# Patient Record
Sex: MALE | Race: WHITE | URBAN - METROPOLITAN AREA
[De-identification: names, ages, dates, MRNs, and addresses within clinical notes are randomized per-mention and may not be internally consistent; named-entity substitution may affect disease eponyms.]

---

## 2020-11-23 ENCOUNTER — OFFICE VISIT (OUTPATIENT)
Dept: HEMATOLOGY | Age: 40
End: 2020-11-23

## 2020-11-23 ENCOUNTER — HOSPITAL ENCOUNTER (OUTPATIENT)
Dept: LAB | Age: 40
Discharge: HOME | End: 2020-11-23
Payer: COMMERCIAL

## 2020-11-23 VITALS
SYSTOLIC BLOOD PRESSURE: 115 MMHG | HEART RATE: 95 BPM | TEMPERATURE: 97 F | WEIGHT: 297 LBS | HEIGHT: 72 IN | BODY MASS INDEX: 40.23 KG/M2 | DIASTOLIC BLOOD PRESSURE: 79 MMHG | OXYGEN SATURATION: 98 %

## 2020-11-23 DIAGNOSIS — K76.0 NAFLD (NONALCOHOLIC FATTY LIVER DISEASE): ICD-10-CM

## 2020-11-23 DIAGNOSIS — K76.0 NAFLD (NONALCOHOLIC FATTY LIVER DISEASE): Primary | ICD-10-CM

## 2020-11-23 PROBLEM — S06.9XAA TBI (TRAUMATIC BRAIN INJURY): Status: ACTIVE | Noted: 2020-11-23

## 2020-11-23 LAB
ALBUMIN SERPL-MCNC: 4.2 G/DL (ref 3.4–5)
ALBUMIN/GLOB SERPL: 1.3 {RATIO} (ref 0.8–1.7)
ALP SERPL-CCNC: 112 U/L (ref 45–117)
ALT SERPL-CCNC: 90 U/L (ref 16–61)
AST SERPL-CCNC: 46 U/L (ref 10–38)
BASOPHILS # BLD: 0 K/UL (ref 0–0.1)
BASOPHILS NFR BLD: 0 % (ref 0–2)
BILIRUB DIRECT SERPL-MCNC: 0.3 MG/DL (ref 0–0.2)
BILIRUB SERPL-MCNC: 1.4 MG/DL (ref 0.2–1)
DIFFERENTIAL METHOD BLD: NORMAL
EOSINOPHIL # BLD: 0.4 K/UL (ref 0–0.4)
EOSINOPHIL NFR BLD: 5 % (ref 0–5)
ERYTHROCYTE [DISTWIDTH] IN BLOOD BY AUTOMATED COUNT: 13 % (ref 11.6–14.5)
FERRITIN SERPL-MCNC: 160 NG/ML (ref 8–388)
GLOBULIN SER CALC-MCNC: 3.2 G/DL (ref 2–4)
HCT VFR BLD AUTO: 45.8 % (ref 36–48)
HGB BLD-MCNC: 15.9 G/DL (ref 13–16)
INR PPP: 1.1 (ref 0.8–1.2)
IRON SATN MFR SERPL: 25 % (ref 20–50)
IRON SERPL-MCNC: 82 UG/DL (ref 50–175)
LYMPHOCYTES # BLD: 1.9 K/UL (ref 0.9–3.6)
LYMPHOCYTES NFR BLD: 24 % (ref 21–52)
MCH RBC QN AUTO: 31.7 PG (ref 24–34)
MCHC RBC AUTO-ENTMCNC: 34.7 G/DL (ref 31–37)
MCV RBC AUTO: 91.2 FL (ref 74–97)
MONOCYTES # BLD: 0.7 K/UL (ref 0.05–1.2)
MONOCYTES NFR BLD: 10 % (ref 3–10)
NEUTS SEG # BLD: 4.7 K/UL (ref 1.8–8)
NEUTS SEG NFR BLD: 61 % (ref 40–73)
PLATELET # BLD AUTO: 265 K/UL (ref 135–420)
PMV BLD AUTO: 10.2 FL (ref 9.2–11.8)
PROT SERPL-MCNC: 7.4 G/DL (ref 6.4–8.2)
PROTHROMBIN TIME: 13.5 SEC (ref 11.5–15.2)
RBC # BLD AUTO: 5.02 M/UL (ref 4.7–5.5)
TIBC SERPL-MCNC: 328 UG/DL (ref 250–450)
WBC # BLD AUTO: 7.7 K/UL (ref 4.6–13.2)

## 2020-11-23 PROCEDURE — 86704 HEP B CORE ANTIBODY TOTAL: CPT

## 2020-11-23 PROCEDURE — 99204 OFFICE O/P NEW MOD 45 MIN: CPT | Performed by: NURSE PRACTITIONER

## 2020-11-23 PROCEDURE — 86706 HEP B SURFACE ANTIBODY: CPT

## 2020-11-23 PROCEDURE — 85025 COMPLETE CBC W/AUTO DIFF WBC: CPT

## 2020-11-23 PROCEDURE — 83540 ASSAY OF IRON: CPT

## 2020-11-23 PROCEDURE — 85610 PROTHROMBIN TIME: CPT

## 2020-11-23 PROCEDURE — 83516 IMMUNOASSAY NONANTIBODY: CPT

## 2020-11-23 PROCEDURE — 82728 ASSAY OF FERRITIN: CPT

## 2020-11-23 PROCEDURE — 86803 HEPATITIS C AB TEST: CPT

## 2020-11-23 PROCEDURE — 86256 FLUORESCENT ANTIBODY TITER: CPT

## 2020-11-23 PROCEDURE — 82107 ALPHA-FETOPROTEIN L3: CPT

## 2020-11-23 PROCEDURE — 86038 ANTINUCLEAR ANTIBODIES: CPT

## 2020-11-23 PROCEDURE — 80076 HEPATIC FUNCTION PANEL: CPT

## 2020-11-23 PROCEDURE — 36415 COLL VENOUS BLD VENIPUNCTURE: CPT

## 2020-11-23 PROCEDURE — 86708 HEPATITIS A ANTIBODY: CPT

## 2020-11-23 RX ORDER — PHENTERMINE HYDROCHLORIDE 30 MG/1
CAPSULE ORAL
COMMUNITY
Start: 2020-11-09 | End: 2022-02-23

## 2020-11-23 RX ORDER — TRAZODONE HYDROCHLORIDE 50 MG/1
50 TABLET ORAL
COMMUNITY
Start: 2020-11-09

## 2020-11-23 NOTE — PROGRESS NOTES
[address] [facility], Varun WOLFE, Joycelyn Mixon, Caitlyn Hernandez MD, MPH      Liana Navarro, CODY Mathias, [facility]     Rylie Conte, [facility]   Natividad Palomino, [facility]    Yazmin García, [facility]       [facility] [address]    at [address], [facility] Thai Bates  22.    [phone]    FAX: [address] [place] [address], [address]    [phone]    FAX: [phone]         Patient Care Team:  Pete Cortez NP as PCP - General (Nurse Practitioner)      Problem List  Date Reviewed: 11/23/2020          Codes Class Noted    NAFLD (nonalcoholic fatty liver disease) ICD-10-CM: K76.0  ICD-9-CM: 571.8  11/23/2020        TBI (traumatic brain injury) ([facility] ICD-10-CM: S06. 9X9A  ICD-9-CM: 854.00  11/23/2020                The clinicians listed above have asked me to see Radha Timmons in consultation regarding suspected fatty liver disease and its management. All medical records sent by the referring physicians were reviewed including imaging studies. The patient is a 47 y.o.  male who is suspected to have fatty liver disease based upon ultrasound. The most recent laboratory studies indicate that the liver transaminases are elevated,  alkaline phosphatase is normal, tests of hepatic synthetic and metabolic function are   normal, and the platelet count is normal.      Serologic evaluation for markers of chronic liver disease has either not been performed or the results are not available. The most recent imaging of the liver was Ultrasound performed in 10/2020. Results suggest fatty liver disease. No liver mass lesions noted.     An assessment of liver fibrosis with biopsy or elastography has not been performed. The patient had not started any new medications within 3 months preceding the elevation in liver chemistries. The patient has no symptoms which can be attributed to the liver disorder. The patient completes all daily activities without any functional limitations. The patient has not experienced fatigue, fevers, chills, shortness of breath, chest pain, pain in the right side over the liver, diffuse abdominal pain, nausea, vomiting, constipation, diarrhrea, dry eyes, dry mouth, arthralgias, myalgias, yellowing of the eyes or skin, itching, dark urine, problems concentrating, swelling of the abdomen, swelling of the lower extremities, hematemesis, or hematochezia. ASSESSMENT AND PLAN:  Fatty liver  The diagnosis is based upon imaging. The histologic severity has not been defined. The most recent laboratory studies indicate that the liver transaminases are elevated,  alkaline phosphatase is normal, tests of hepatic synthetic and metabolic function are   normal, and the platelet count is normal.      Based upon laboratory studies and imaging, the patient does not appear to have significant liver injury. Will perform laboratory testing to monitor liver function and degree of liver injury. Serologic testing for causes of chronic liver disease was ordered. Only a liver biopsy can confirm if the patient does have fatty liver and differentiate NAFL from PASCUAL. The treatment is the same; weight reduction. If a liver biopsy demonstrates PASCUAL the patient could be eligible for enrollment into clinical trials for treatment of PASCUAL. The need to perform a liver biopsy to help determine the cause and severity of the liver test abnormalities was discussed. The risks of performing the liver biopsy including pain, puncture of the lung, gallbladder, intestine or kidney and bleeding were discussed. There is no reason to perform liver biopsy at this time.   Liver chemistries will be monitored. If the liver enzymes remain persistently elevated over the next 1-2 years a liver biopsy should be performed to ensure there is no ongoing chronic liver disease. If the patient looses 20% of current body weight, which is 60 pounds, down to a weight of of 240 pounds, all steatosis will have resolved. Once all steatosis has resolved all inflammation will resolve. Then all fibrosis will gradually resolve and the liver could eventually be normal.    There is currently no FDA approved medical treatment for fatty liver, NALFD or PASCUAL. The only medical treatments for PASCUAL are though clinical trials. The patient would be a good candidate for enrollment into a clinical trial if found to have PASCUAL. The need to perform an assessment of liver fibrosis was discussed with the patient. The Fibroscan can assess liver fibrosis and determine if a patient has advanced fibrosis or cirrhosis without the need for liver biopsy. This will be performed at the next office visit. If the Fibroscan suggests advanced fibrosis then a liver biopsy should be considered. The Fibroscan can be repeated annually or as often as clinically indicated to assess for fibrosis progression and/or regression. Since a liver biopsy was not performed it is possible the patient may not have fatty liver or this may not be contributing to the elevation in liver enzymes. If liver enzymes do not return to normal with weight reduction then additional evaluation including liver biopsy may be necessary to determine if the elevated liver enzymes is due to another etiology. Counseling for diet and weight loss in patients with confirmed or suspected NAFLD  The patient was counseled regarding diet and exercise to achieve weight loss.   The best diet for patients with fatty liver is one very low in carbohydrates and enriched with protein such as an Blanka's program.      The patient was told not to consume any food products and drinks containing fructose as this enhances hepatic fat synthesis. There is no medication or vitamin supplements that we advocate for PASCUAL. Using glitazones in patients without diabetes mellitus has been shown to reduce fat content in the liver but has no effect on fibrosis and is associated with weight gain. Vitamin E has also been used but the data is not very good and most experts no longer advocate this. Screening for Hepatocellular Carcinoma  [address] screening has recently been performed with ultrasound and does not suggest [address] AFP was ordered today. Treatment of other medical problems in patients with chronic liver disease  There are no contraindications for the patient to take most medications that are necessary for treatment of other medical issues. The patient does not comsume alcohol on a daily basis. Normal doses of acetaminophen, as recommended on the label of the bottle, are not hepatotoxic except in the setting of daily alcohol use, even in patients with cirrhosis and can be utilized for pain. Counseling for alcohol in patients with chronic liver disease  The patient was counseled regarding alcohol consumption and the effect of alcohol on chronic liver disease. The patient does not consume any significant amount of alcohol. The patient has been abstinent from alcohol since 06/2018 when he suffered a TBI. The patient was reminded that alcohol can cause fatty liver. Vaccinations   The need for vaccination against viral hepatitis A and B will be assessed with serologic and instituted as appropriate. Routine vaccinations against other bacterial and viral agents can be performed as indicated. Annual flu vaccination should be administered if indicated. ALLERGIES  Allergies no known allergies    MEDICATIONS  Current Outpatient Medications   Medication Sig    traZODone (DESYREL) 50 mg tablet Take 50 mg by mouth.     phentermine 30 mg capsule TAKE 1 CAPSULE BY MOUTH ONCE DAILY BEFORE BREAKFAST     No current facility-administered medications for this visit. SYSTEM REVIEW NOT RELATED TO LIVER DISEASE OR REVIEWED ABOVE:  Constitution systems: Negative for fever, chills, weight gain, weight loss. Eyes: Negative for visual changes. ENT: Negative for sore throat, painful swallowing. Respiratory: Negative for cough, hemoptysis, SOB. Cardiology: Negative for chest pain, palpitations. GI:  Negative for constipation or diarrhea. : Negative for urinary frequency, dysuria, hematuria, nocturia. Skin: Negative for rash. Hematology: Negative for easy bruising, blood clots. Musculo-skelatal: Negative for back pain, muscle pain, weakness. Neurologic: Negative for headaches, dizziness, vertigo, memory problems not related to HE. Psychology: Negative for anxiety, depression. FAMILY HISTORY:  The father  of metastatic kidney cancer at age 62. The mother has the following chronic disease(s): history of ovarian cancer. There is no family history of liver disease. There is no family history of immune disorders. SOCIAL HISTORY:  The patient is . The patient has 4 children. The patient has never used tobacco products. The patient never been a big drinker. The patient has been abstinent from alcohol since 2018. The patient currently works full time as [employer]. PHYSICAL EXAMINATION:  VS: per nursing note  General: No acute distress. Eyes: Sclera anicteric. ENT: No oral lesions. Thyroid normal.  Nodes: No adenopathy. Skin: No spider angiomata. No jaundice. No palmar erythema. Respiratory: Lungs clear to auscultation. Cardiovascular: Regular heart rate. No murmurs. No JVD. Abdomen: Soft non-tender, liver size normal to percussion/palpation. Spleen not palpable. No obvious ascites. Extremities: No edema. No muscle wasting. No gross arthritic changes. Neurologic: Alert and oriented.   Cranial nerves grossly intact. No asterixis. LABORATORY STUDIES:  Liver [place] of [address] [place] [address] 11/23/2020   WBC 4.6 - 13.2 K/uL 7.7   ANC 1.8 - 8.0 K/UL 4.7   HGB 13.0 - 16.0 g/dL 15.9    - 420 K/uL 265   INR 0.8 - 1.2   1.1   AST 10 - 38 U/L 46 (H)   ALT 16 - 61 U/L 90 (H)   Alk Phos 45 - 117 U/L 112   Bili, Total 0.2 - 1.0 MG/DL 1.4 (H)   Bili, Direct 0.0 - 0.2 MG/DL 0.3 (H)   Albumin 3.4 - 5.0 g/dL 4.2     From 11/2020 DR SAM AYOUB [facility]):  AST/ ALT/ ALP/ T. BILI/ ALB: 59/ 79/ 97/ 1.0/ 4.5  BUN/ CRT/ PLT: 9/ 0.9/ 274,000    SEROLOGIES:  Not available or performed. Testing was performed today. LIVER HISTOLOGY:  Not available or performed    ENDOSCOPIC PROCEDURES:  Not available or performed    RADIOLOGY:  10/2020. Abdominal ultrasound DR SAM AYOUB [facility]): Enlarged measuring 19 cm in length and increased in echotexture suggesting fatty infiltration. OTHER TESTING:  Not available or performed    FOLLOW-UP:  All of the issues listed above in the Assessment and Plan were discussed with the patient. All questions were answered. The patient expressed a clear understanding of the above. [facility] in 4 weeks for Fibroscan and to review all data and determine the treatment plan.     Genia Meredith, JUSTIN-LELE  Liver [place] [address], [address] [place] [address]   [phone]

## 2020-11-24 LAB
ACTIN IGG SERPL-ACNC: 3 UNITS (ref 0–19)
AFP L3 MFR SERPL: NORMAL % (ref 0–9.9)
AFP SERPL-MCNC: 2.4 NG/ML (ref 0–8)
C-ANCA TITR SER IF: NORMAL TITER
COMMENT, 144067: NORMAL
HAV AB SER QL IA: NEGATIVE
HBV CORE AB SERPL QL IA: NEGATIVE
HBV SURFACE AB SER QL IA: NEGATIVE
HBV SURFACE AB SERPL IA-ACNC: <3.1 MIU/ML
HCV AB S/CO SERPL IA: <0.1 S/CO RATIO (ref 0–0.9)
HEP BS AB COMMENT,HBSAC: ABNORMAL
P-ANCA ATYPICAL TITR SER IF: NORMAL TITER
P-ANCA TITR SER IF: NORMAL TITER

## 2020-11-29 LAB — ANA TITR SER IF: NEGATIVE {TITER}

## 2020-12-22 ENCOUNTER — OFFICE VISIT (OUTPATIENT)
Dept: HEMATOLOGY | Age: 40
End: 2020-12-22
Payer: COMMERCIAL

## 2020-12-22 VITALS
SYSTOLIC BLOOD PRESSURE: 137 MMHG | HEART RATE: 87 BPM | WEIGHT: 289.38 LBS | DIASTOLIC BLOOD PRESSURE: 91 MMHG | TEMPERATURE: 97.6 F | OXYGEN SATURATION: 92 % | BODY MASS INDEX: 39.25 KG/M2

## 2020-12-22 DIAGNOSIS — K76.0 NAFLD (NONALCOHOLIC FATTY LIVER DISEASE): Primary | ICD-10-CM

## 2020-12-22 DIAGNOSIS — E66.01 SEVERE OBESITY (HCC): ICD-10-CM

## 2020-12-22 PROCEDURE — 91200 LIVER ELASTOGRAPHY: CPT | Performed by: NURSE PRACTITIONER

## 2020-12-22 PROCEDURE — 99214 OFFICE O/P EST MOD 30 MIN: CPT | Performed by: NURSE PRACTITIONER

## 2020-12-22 NOTE — PROGRESS NOTES
[address] hospitals, MD, [address], Cite Alexa JULIAN Mixon MD, MPH      ELSY Andrews-LELE Mccauley, [facility]     Rylie ENGLE Inés, [facility]   Theron Hylton, P[facility]    Nia Claudio, [facility]       Cajem Torres Kenny De [address] 136    at [address], [address] Thai Bates  22.    [phone]    FAX: [address] [place] [address], [address], [address]    [phone]    FAX: [phone]         Patient Care Team:  Mehrdad Davidson NP as PCP - General (Nurse Practitioner)      Problem List  Date Reviewed: 12/22/2020          Codes Class Noted    Severe obesity [facility]) ICD-10-CM: E66.01  ICD-9-CM: 278.01  12/22/2020        NAFLD (nonalcoholic fatty liver disease) ICD-10-CM: K76.0  ICD-9-CM: 571.8  11/23/2020        TBI (traumatic brain injury) ([facility] ICD-10-CM: S06. 9X9A  ICD-9-CM: 854.00  11/23/2020                Calvin Nicole returns to the [facility] today for fibroscan assessment of hepatic fibrosis and for education and management of suspected fatty liver disease. The active problem list, all pertinent past medical history, medications, liver histology, endoscopic studies, radiologic findings and laboratory findings related to the liver disorder were reviewed with the patient. The patient is a 47 y.o.  male who is suspected to have fatty liver disease based upon ultrasound. The most recent laboratory studies indicate that the liver transaminases are elevated,  alkaline phosphatase is normal, tests of hepatic synthetic and metabolic function are   normal, and the platelet count is normal.      Serologic evaluation was negative for all causes of chronic liver disease.      The most recent imaging of the liver was Ultrasound performed in 10/2020. Results suggest fatty liver disease. No liver mass lesions noted. An assessment of liver fibrosis with fibroscan was performed during this appointment. Results of the scan indicate normal liver stiffness, no hepatic fibrosis, and fatty liver. The patient had not started any new medications within 3 months preceding the elevation in liver chemistries. The patient has no symptoms which can be attributed to the liver disorder. The patient completes all daily activities without any functional limitations. The patient has not experienced fatigue, fevers, chills, shortness of breath, chest pain, pain in the right side over the liver, diffuse abdominal pain, nausea, vomiting, constipation, diarrhrea, dry eyes, dry mouth, arthralgias, myalgias, yellowing of the eyes or skin, itching, dark urine, problems concentrating, swelling of the abdomen, swelling of the lower extremities, hematemesis, or hematochezia. ASSESSMENT AND PLAN:  Fatty liver/Benign Steatosis  The diagnosis is based upon imaging and fibroscan CAP score. Fibroscan analysis indicates no hepatic fibrosis. The most recent laboratory studies indicate that the liver transaminases are elevated,  alkaline phosphatase is normal, tests of hepatic synthetic and metabolic function are   normal, and the platelet count is normal.      Based upon laboratory studies, imaging, and fibroscan assessment, the patient does not have significant liver injury. Serologic evaluation was negative for all causes of chronic liver disease. Only a liver biopsy can confirm if the patient does have fatty liver and differentiate NAFL from PASCUAL. The treatment is the same; weight reduction. If a liver biopsy demonstrates PASCUAL the patient could be eligible for enrollment into clinical trials for treatment of PASCUAL.     If the liver enzymes remain persistently elevated over the next 1-2 years a liver biopsy should be performed to ensure there is no ongoing chronic liver disease. If the patient looses 20% of current body weight, which is 60 pounds, down to a weight of of 240 pounds, all steatosis will have resolved. Once all steatosis has resolved all inflammation will resolve. Then all fibrosis will gradually resolve and the liver could eventually be normal.    There is currently no FDA approved medical treatment for fatty liver, NALFD or PASCUAL. The only medical treatments for PASCUAL are though clinical trials. The patient would be a good candidate for enrollment into a clinical trial if found to have PASCUAL. The need to perform an assessment of liver fibrosis was discussed with the patient. The Fibroscan can assess liver fibrosis and determine if a patient has advanced fibrosis or cirrhosis without the need for liver biopsy. This was performed during this appointment. There is no hepatic fibrosis. The Fibroscan can be repeated annually or as often as clinically indicated to assess for fibrosis progression and/or regression. Since a liver biopsy was not performed it is possible the patient may not have fatty liver or this may not be contributing to the elevation in liver enzymes. If liver enzymes do not return to normal with weight reduction then additional evaluation including liver biopsy may be necessary to determine if the elevated liver enzymes is due to another etiology. Counseling for diet and weight loss in patients with confirmed or suspected NAFLD  The patient was counseled regarding diet and exercise to achieve weight loss. The best diet for patients with fatty liver is one very low in carbohydrates and enriched with protein such as an Blanka's program.      The patient was told not to consume any food products and drinks containing fructose as this enhances hepatic fat synthesis. There is no medication or vitamin supplements that we advocate for PASCUAL.     Using glitazones in patients without diabetes mellitus has been shown to reduce fat content in the liver but has no effect on fibrosis and is associated with weight gain. Vitamin E has also been used but the data is not very good and most experts no longer advocate this. Screening for Hepatocellular Carcinoma  [facility] screening has recently been performed with ultrasound and does not suggest [address] AFP was WNL. Treatment of other medical problems in patients with chronic liver disease  There are no contraindications for the patient to take most medications that are necessary for treatment of other medical issues. The patient does not comsume alcohol on a daily basis. Normal doses of acetaminophen, as recommended on the label of the bottle, are not hepatotoxic except in the setting of daily alcohol use, even in patients with cirrhosis and can be utilized for pain. Counseling for alcohol in patients with chronic liver disease  The patient was counseled regarding alcohol consumption and the effect of alcohol on chronic liver disease. The patient does not consume any significant amount of alcohol. The patient has been abstinent from alcohol since 06/2018 when he suffered a TBI. The patient was reminded that alcohol can cause fatty liver. Vaccinations   Vaccination for viral hepatitis A and B is recommended. The patient does not have serologic evidence of prior exposure or vaccination with immunity. Routine vaccinations against other bacterial and viral agents can be performed as indicated. Annual flu vaccination should be administered if indicated. ALLERGIES  No Known Allergies    MEDICATIONS  Current Outpatient Medications   Medication Sig    traZODone (DESYREL) 50 mg tablet Take 50 mg by mouth.  phentermine 30 mg capsule TAKE 1 CAPSULE BY MOUTH ONCE DAILY BEFORE BREAKFAST     No current facility-administered medications for this visit.         SYSTEM REVIEW NOT RELATED TO LIVER DISEASE OR REVIEWED ABOVE:  Constitution systems: Negative for fever, chills, weight gain, weight loss. Eyes: Negative for visual changes. ENT: Negative for sore throat, painful swallowing. Respiratory: Negative for cough, hemoptysis, SOB. Cardiology: Negative for chest pain, palpitations. GI:  Negative for constipation or diarrhea. : Negative for urinary frequency, dysuria, hematuria, nocturia. Skin: Negative for rash. Hematology: Negative for easy bruising, blood clots. Musculo-skelatal: Negative for back pain, muscle pain, weakness. Neurologic: Negative for headaches, dizziness, vertigo, memory problems not related to HE. Psychology: Negative for anxiety, depression. FAMILY HISTORY:  The father  of metastatic kidney cancer at age 62. The mother has the following chronic disease(s): history of ovarian cancer. There is no family history of liver disease. There is no family history of immune disorders. SOCIAL HISTORY:  The patient is . The patient has 4 children. The patient has never used tobacco products. The patient never been a big drinker. The patient has been abstinent from alcohol since 2018. The patient currently works full time as [employer]. PHYSICAL EXAMINATION:  VS: per nursing note  General: No acute distress. Eyes: Sclera anicteric. ENT: No oral lesions. Thyroid normal.  Nodes: No adenopathy. Skin: No spider angiomata. No jaundice. No palmar erythema. Respiratory: Lungs clear to auscultation. Cardiovascular: Regular heart rate. No murmurs. No JVD. Abdomen: Soft non-tender, liver size normal to percussion/palpation. Spleen not palpable. No obvious ascites. Extremities: No edema. No muscle wasting. No gross arthritic changes. Neurologic: Alert and oriented. Cranial nerves grossly intact. No asterixis.     LABORATORY STUDIES:  Liver [place] of [address] & Units 2020   WBC 4.6 - 13.2 K/uL 7.7   ANC 1.8 - 8.0 K/UL 4.7   HGB 13.0 - 16.0 g/dL 15.9   PLT 135 - 420 K/uL 265   INR 0.8 - 1.2   1.1   AST 10 - 38 U/L 46 (H)   ALT 16 - 61 U/L 90 (H)   Alk Phos 45 - 117 U/L 112   Bili, Total 0.2 - 1.0 MG/DL 1.4 (H)   Bili, Direct 0.0 - 0.2 MG/DL 0.3 (H)   Albumin 3.4 - 5.0 g/dL 4.2     From 11/2020 DR SAM AYOUB [facility]):  AST/ ALT/ ALP/ T. BILI/ ALB: 59/ 79/ 97/ 1.0/ 4.5  BUN/ CRT/ PLT: 9/ 0.9/ 274,000    SEROLOGIES:  Serologies Latest Ref Rng & Units 11/23/2020   Hep A Ab, Total Negative   Negative   Hep B Core Ab, Total Negative   Negative   Hep B Surface Ab >10.0 mIU/mL <3.10 (L)   Hep B Surface Ab Interp POS   Negative (A)   Hep C Ab 0.0 - 0.9 s/co ratio <0.1   Ferritin 8 - 388 NG/   Iron % Saturation 20 - 50 % 25   ERLIN, IFA  Negative   C-ANCA Neg:<1:20 titer <1:20   P-ANCA Neg:<1:20 titer <1:20   ANCA Neg:<1:20 titer <1:20   ASMCA 0 - 19 Units 3     LIVER HISTOLOGY:  12/2020. FibroScan performed at [address]. EkPa was 5.4. IQR/med 15%. . The results suggested a fibrosis level of F0. The CAP score suggests fatty liver. ENDOSCOPIC PROCEDURES:  Not available or performed    RADIOLOGY:  10/2020. Abdominal ultrasound DR SAM AYOUB [facility]): Enlarged measuring 19 cm in length and increased in echotexture suggesting fatty infiltration. OTHER TESTING:  Not available or performed    FOLLOW-UP:  All of the issues listed above in the Assessment and Plan were discussed with the patient. All questions were answered. The patient expressed a clear understanding of the above. [address] in 6 months with a weight loss goal of 12 pounds.       JUSTIN Nielson-C  Liver [place] of [address], [address] [place] [address], [address] [place] [address]   [phone]

## 2021-06-23 ENCOUNTER — OFFICE VISIT (OUTPATIENT)
Dept: HEMATOLOGY | Age: 41
End: 2021-06-23
Payer: COMMERCIAL

## 2021-06-23 ENCOUNTER — HOSPITAL ENCOUNTER (OUTPATIENT)
Dept: LAB | Age: 41
Discharge: HOME | End: 2021-06-23
Payer: COMMERCIAL

## 2021-06-23 VITALS
HEART RATE: 82 BPM | RESPIRATION RATE: 17 BRPM | BODY MASS INDEX: 38.49 KG/M2 | DIASTOLIC BLOOD PRESSURE: 103 MMHG | TEMPERATURE: 97 F | SYSTOLIC BLOOD PRESSURE: 132 MMHG | WEIGHT: 284.2 LBS | OXYGEN SATURATION: 98 % | HEIGHT: 72 IN

## 2021-06-23 DIAGNOSIS — K76.0 NAFLD (NONALCOHOLIC FATTY LIVER DISEASE): ICD-10-CM

## 2021-06-23 DIAGNOSIS — K76.0 NAFLD (NONALCOHOLIC FATTY LIVER DISEASE): Primary | ICD-10-CM

## 2021-06-23 LAB
ALBUMIN SERPL-MCNC: 4.1 G/DL (ref 3.4–5)
ALBUMIN/GLOB SERPL: 1.2 {RATIO} (ref 0.8–1.7)
ALP SERPL-CCNC: 96 U/L (ref 45–117)
ALT SERPL-CCNC: 68 U/L (ref 16–61)
ANION GAP SERPL CALC-SCNC: 4 MMOL/L (ref 3–18)
AST SERPL-CCNC: 32 U/L (ref 10–38)
BASOPHILS # BLD: 0.1 K/UL (ref 0–0.1)
BASOPHILS NFR BLD: 1 % (ref 0–2)
BILIRUB DIRECT SERPL-MCNC: 0.2 MG/DL (ref 0–0.2)
BILIRUB SERPL-MCNC: 1.2 MG/DL (ref 0.2–1)
BUN SERPL-MCNC: 14 MG/DL (ref 7–18)
BUN/CREAT SERPL: 14 (ref 12–20)
CALCIUM SERPL-MCNC: 9.5 MG/DL (ref 8.5–10.1)
CHLORIDE SERPL-SCNC: 108 MMOL/L (ref 100–111)
CO2 SERPL-SCNC: 30 MMOL/L (ref 21–32)
CREAT SERPL-MCNC: 0.97 MG/DL (ref 0.6–1.3)
DIFFERENTIAL METHOD BLD: ABNORMAL
EOSINOPHIL # BLD: 0.3 K/UL (ref 0–0.4)
EOSINOPHIL NFR BLD: 4 % (ref 0–5)
ERYTHROCYTE [DISTWIDTH] IN BLOOD BY AUTOMATED COUNT: 13 % (ref 11.6–14.5)
GLOBULIN SER CALC-MCNC: 3.4 G/DL (ref 2–4)
GLUCOSE SERPL-MCNC: 85 MG/DL (ref 74–99)
HCT VFR BLD AUTO: 49.1 % (ref 36–48)
HGB BLD-MCNC: 16.4 G/DL (ref 13–16)
LYMPHOCYTES # BLD: 2.2 K/UL (ref 0.9–3.6)
LYMPHOCYTES NFR BLD: 30 % (ref 21–52)
MCH RBC QN AUTO: 31.1 PG (ref 24–34)
MCHC RBC AUTO-ENTMCNC: 33.4 G/DL (ref 31–37)
MCV RBC AUTO: 93 FL (ref 74–97)
MONOCYTES # BLD: 0.7 K/UL (ref 0.05–1.2)
MONOCYTES NFR BLD: 9 % (ref 3–10)
NEUTS SEG # BLD: 4 K/UL (ref 1.8–8)
NEUTS SEG NFR BLD: 55 % (ref 40–73)
PLATELET # BLD AUTO: 284 K/UL (ref 135–420)
PMV BLD AUTO: 9.8 FL (ref 9.2–11.8)
POTASSIUM SERPL-SCNC: 4.6 MMOL/L (ref 3.5–5.5)
PROT SERPL-MCNC: 7.5 G/DL (ref 6.4–8.2)
RBC # BLD AUTO: 5.28 M/UL (ref 4.35–5.65)
SODIUM SERPL-SCNC: 142 MMOL/L (ref 136–145)
WBC # BLD AUTO: 7.2 K/UL (ref 4.6–13.2)

## 2021-06-23 PROCEDURE — 36415 COLL VENOUS BLD VENIPUNCTURE: CPT

## 2021-06-23 PROCEDURE — 99214 OFFICE O/P EST MOD 30 MIN: CPT | Performed by: NURSE PRACTITIONER

## 2021-06-23 PROCEDURE — 80048 BASIC METABOLIC PNL TOTAL CA: CPT

## 2021-06-23 PROCEDURE — 80076 HEPATIC FUNCTION PANEL: CPT

## 2021-06-23 PROCEDURE — 85025 COMPLETE CBC W/AUTO DIFF WBC: CPT

## 2021-06-23 NOTE — PROGRESS NOTES
Jay Carty MD, Almo, Naveed Treviño MD, MPH      Mray Huston, CODY Villeda, [facility]     Rylie Conte, [facility]   Dao Wise SAM-LELE Iraheta, [facility]       [facility] 136    at [address], [address] Thai Marquez  22.    [phone]    FAX: [address] [place] [address], [address]    [phone]    FAX: [phone]         Patient Care Team:  Sofia Harding NP as PCP - General (Nurse Practitioner)      Problem List  Date Reviewed: 12/22/2020        Codes Class Noted    Severe obesity [facility]) ICD-10-CM: E66.01  ICD-9-CM: 278.01  12/22/2020        NAFLD (nonalcoholic fatty liver disease) ICD-10-CM: K76.0  ICD-9-CM: 571.8  11/23/2020        TBI (traumatic brain injury) ([facility] ICD-10-CM: S06. 9X9A  ICD-9-CM: 854.00  11/23/2020                Roberto Mcmahon returns to the [place] [address] today for education and management of suspected fatty liver disease. The active problem list, all pertinent past medical history, medications, liver histology, endoscopic studies, radiologic findings and laboratory findings related to the liver disorder were reviewed with the patient. The patient is a 46 y.o.  male who is suspected to have fatty liver disease based upon ultrasound. The most recent laboratory studies indicate that the liver transaminases are elevated,  alkaline phosphatase is normal, tests of hepatic synthetic and metabolic function are normal, and the platelet count is normal.      Serologic evaluation was negative for all causes of chronic liver disease. The most recent imaging of the liver was Ultrasound performed in 10/2020.     Results suggest fatty liver disease. No liver mass lesions noted. An assessment of liver fibrosis with fibroscan was performed in 12/220. Results of the scan indicate normal liver stiffness, no hepatic fibrosis, and fatty liver. The patient had not started any new medications within 3 months preceding the elevation in liver chemistries. The patient has no symptoms which can be attributed to the liver disorder. The patient completes all daily activities without any functional limitations. The patient has not experienced fatigue, fevers, chills, shortness of breath, chest pain, pain in the right side over the liver, diffuse abdominal pain, nausea, vomiting, constipation, diarrhrea, dry eyes, dry mouth, arthralgias, myalgias, yellowing of the eyes or skin, itching, dark urine, problems concentrating, swelling of the abdomen, swelling of the lower extremities, hematemesis, or hematochezia. Since the last office appointment:  Has lost 13 pounds. He lost more but recently injured his right knee and has been unable to exercise. He was down to about 275. ASSESSMENT AND PLAN:  Fatty liver/Benign Steatosis  The diagnosis is based upon imaging and fibroscan CAP score. Fibroscan analysis indicates no hepatic fibrosis. The most recent laboratory studies indicate that the liver transaminases are elevated,  alkaline phosphatase is normal, tests of hepatic synthetic and metabolic function are   normal, and the platelet count is normal.      Based upon laboratory studies, imaging, and fibroscan assessment, the patient does not have significant liver injury. Serologic evaluation was negative for all causes of chronic liver disease. Only a liver biopsy can confirm if the patient does have fatty liver and differentiate NAFL from PASCUAL. The treatment is the same; weight reduction. If a liver biopsy demonstrates PASCUAL the patient could be eligible for enrollment into clinical trials for treatment of PASCUAL.     If the liver enzymes remain persistently elevated over the next 1-2 years a liver biopsy should be performed to ensure there is no ongoing chronic liver disease. If the patient looses 20% of current body weight, which is 60 pounds, down to a weight of of 240 pounds, all steatosis will have resolved. Once all steatosis has resolved all inflammation will resolve. Then all fibrosis will gradually resolve and the liver could eventually be normal.    There is currently no FDA approved medical treatment for fatty liver, NALFD or PASCUAL. The only medical treatments for PASCUAL are though clinical trials. The patient would be a good candidate for enrollment into a clinical trial if found to have PASCUAL. The need to perform an assessment of liver fibrosis was discussed with the patient. The Fibroscan can assess liver fibrosis and determine if a patient has advanced fibrosis or cirrhosis without the need for liver biopsy. This was performed in 12/2020. There is no hepatic fibrosis. There is hepatic steatosis. The Fibroscan can be repeated annually or as often as clinically indicated to assess for fibrosis progression and/or regression. Since a liver biopsy was not performed it is possible the patient may not have fatty liver or this may not be contributing to the elevation in liver enzymes. If liver enzymes do not return to normal with weight reduction then additional evaluation including liver biopsy may be necessary to determine if the elevated liver enzymes is due to another etiology. Counseling for diet and weight loss in patients with confirmed or suspected NAFLD  The patient was counseled regarding diet and exercise to achieve weight loss. The best diet for patients with fatty liver is one very low in carbohydrates and enriched with protein such as an Blanka's program.      The patient was told not to consume any food products and drinks containing fructose as this enhances hepatic fat synthesis.     There is no medication or vitamin supplements that we advocate for PASCUAL. Using glitazones in patients without diabetes mellitus has been shown to reduce fat content in the liver but has no effect on fibrosis and is associated with weight gain. Vitamin E has also been used but the data is not very good and most experts no longer advocate this. Screening for Hepatocellular Carcinoma  [facility] screening has recently been performed with ultrasound and does not suggest [address] AFP was WNL. Treatment of other medical problems in patients with chronic liver disease  There are no contraindications for the patient to take most medications that are necessary for treatment of other medical issues. The patient does not comsume alcohol on a daily basis. Normal doses of acetaminophen, as recommended on the label of the bottle, are not hepatotoxic except in the setting of daily alcohol use, even in patients with cirrhosis and can be utilized for pain. Counseling for alcohol in patients with chronic liver disease  The patient was counseled regarding alcohol consumption and the effect of alcohol on chronic liver disease. The patient does not consume any significant amount of alcohol. The patient has been abstinent from alcohol since 06/2018 when he suffered a TBI. The patient was reminded that alcohol can cause fatty liver. Vaccinations   Vaccination for viral hepatitis A and B is recommended. The patient does not have serologic evidence of prior exposure or vaccination with immunity. Routine vaccinations against other bacterial and viral agents can be performed as indicated. Annual flu vaccination should be administered if indicated. ALLERGIES  No Known Allergies    MEDICATIONS  Current Outpatient Medications   Medication Sig    traZODone (DESYREL) 50 mg tablet Take 50 mg by mouth.     phentermine 30 mg capsule TAKE 1 CAPSULE BY MOUTH ONCE DAILY BEFORE BREAKFAST     No current facility-administered medications for this visit. SYSTEM REVIEW NOT RELATED TO LIVER DISEASE OR REVIEWED ABOVE:  Constitution systems: Negative for fever, chills, weight gain, weight loss. Eyes: Negative for visual changes. ENT: Negative for sore throat, painful swallowing. Respiratory: Negative for cough, hemoptysis, SOB. Cardiology: Negative for chest pain, palpitations. GI:  Negative for constipation or diarrhea. : Negative for urinary frequency, dysuria, hematuria, nocturia. Skin: Negative for rash. Hematology: Negative for easy bruising, blood clots. Musculo-skelatal: Negative for back pain, muscle pain, weakness. Neurologic: Negative for headaches, dizziness, vertigo, memory problems not related to HE. Psychology: Negative for anxiety, depression. FAMILY HISTORY:  The father  of metastatic kidney cancer at age 62. The mother has the following chronic disease(s): history of ovarian cancer. There is no family history of liver disease. There is no family history of immune disorders. SOCIAL HISTORY:  The patient is . The patient has 4 children. The patient has never used tobacco products. The patient never been a big drinker. The patient has been abstinent from alcohol since 2018. The patient currently works full time as [employer]. PHYSICAL EXAMINATION:  VS: per nursing note  General: No acute distress. Eyes: Sclera anicteric. ENT: No oral lesions. Thyroid normal.  Nodes: No adenopathy. Skin: No spider angiomata. No jaundice. No palmar erythema. Respiratory: Lungs clear to auscultation. Cardiovascular: Regular heart rate. No murmurs. No JVD. Abdomen: Soft non-tender, liver size normal to percussion/palpation. Spleen not palpable. No obvious ascites. Extremities: No edema. No muscle wasting. No gross arthritic changes. Neurologic: Alert and oriented. Cranial nerves grossly intact. No asterixis.     LABORATORY STUDIES:  [facility] of [address] [place] [address] Ref Rng & Units 6/23/2021 11/23/2020   WBC 4.6 - 13.2 K/uL 7.2 7.7   ANC 1.8 - 8.0 K/UL 4.0 4.7   HGB 13.0 - 16.0 g/dL 16.4 (H) 15.9    - 420 K/uL 284 265   INR 0.8 - 1.2    1.1   AST 10 - 38 U/L 32 46 (H)   ALT 16 - 61 U/L 68 (H) 90 (H)   Alk Phos 45 - 117 U/L 96 112   Bili, Total 0.2 - 1.0 MG/DL 1.2 (H) 1.4 (H)   Bili, Direct 0.0 - 0.2 MG/DL 0.2 0.3 (H)   Albumin 3.4 - 5.0 g/dL 4.1 4.2   BUN 7.0 - 18 MG/DL 14    Creat 0.6 - 1.3 MG/DL 0.97    Na 136 - 145 mmol/L 142    K 3.5 - 5.5 mmol/L 4.6    Cl 100 - 111 mmol/L 108    CO2 21 - 32 mmol/L 30    Glucose 74 - 99 mg/dL 85        SEROLOGIES:  Serologies Latest Ref Rng & Units 11/23/2020   Hep A Ab, Total Negative   Negative   Hep B Core Ab, Total Negative   Negative   Hep B Surface Ab >10.0 mIU/mL <3.10 (L)   Hep B Surface Ab Interp POS   Negative (A)   Hep C Ab 0.0 - 0.9 s/co ratio <0.1   Ferritin 8 - 388 NG/   Iron % Saturation 20 - 50 % 25   ERLIN, IFA  Negative   C-ANCA Neg:<1:20 titer <1:20   P-ANCA Neg:<1:20 titer <1:20   ANCA Neg:<1:20 titer <1:20   ASMCA 0 - 19 Units 3     LIVER HISTOLOGY:  12/2020. [facility]. EkPa was 5.4. IQR/med 15%. . The results suggested a fibrosis level of F0. The CAP score suggests fatty liver. ENDOSCOPIC PROCEDURES:  Not available or performed    RADIOLOGY:  10/2020. Abdominal ultrasound DR VARGAS [facility]): Enlarged measuring 19 cm in length and increased in echotexture suggesting fatty infiltration. OTHER TESTING:  Not available or performed    FOLLOW-UP:  All of the issues listed above in the Assessment and Plan were discussed with the patient. All questions were answered. The patient expressed a clear understanding of the above. [address] in 6 months with a weight loss goal of 12 pounds. Will perform another fibroscan assessment during that appointment.       Kelly Morgan, KEYSHAWNP-C  [address] [place] [address] Roads  [address], [address] [place] [address] Aroldofrancoise Jeannine Morton, [facility]   [phone]

## 2022-02-23 ENCOUNTER — HOSPITAL ENCOUNTER (OUTPATIENT)
Dept: LAB | Age: 42
Discharge: HOME | End: 2022-02-23
Payer: COMMERCIAL

## 2022-02-23 ENCOUNTER — OFFICE VISIT (OUTPATIENT)
Dept: HEMATOLOGY | Age: 42
End: 2022-02-23
Payer: COMMERCIAL

## 2022-02-23 VITALS
HEIGHT: 72 IN | OXYGEN SATURATION: 98 % | HEART RATE: 68 BPM | SYSTOLIC BLOOD PRESSURE: 128 MMHG | WEIGHT: 295 LBS | TEMPERATURE: 98.3 F | DIASTOLIC BLOOD PRESSURE: 94 MMHG | BODY MASS INDEX: 39.96 KG/M2 | RESPIRATION RATE: 17 BRPM

## 2022-02-23 DIAGNOSIS — K76.0 NAFLD (NONALCOHOLIC FATTY LIVER DISEASE): ICD-10-CM

## 2022-02-23 DIAGNOSIS — K76.0 NAFLD (NONALCOHOLIC FATTY LIVER DISEASE): Primary | ICD-10-CM

## 2022-02-23 LAB
ALBUMIN SERPL-MCNC: 4.1 G/DL (ref 3.4–5)
ALBUMIN/GLOB SERPL: 1.3 {RATIO} (ref 0.8–1.7)
ALP SERPL-CCNC: 96 U/L (ref 45–117)
ALT SERPL-CCNC: 72 U/L (ref 16–61)
ANION GAP SERPL CALC-SCNC: 2 MMOL/L (ref 3–18)
AST SERPL-CCNC: 32 U/L (ref 10–38)
BASOPHILS # BLD: 0 K/UL (ref 0–0.1)
BASOPHILS NFR BLD: 1 % (ref 0–2)
BILIRUB DIRECT SERPL-MCNC: 0.2 MG/DL (ref 0–0.2)
BILIRUB SERPL-MCNC: 1.2 MG/DL (ref 0.2–1)
BUN SERPL-MCNC: 14 MG/DL (ref 7–18)
BUN/CREAT SERPL: 15 (ref 12–20)
CALCIUM SERPL-MCNC: 9.3 MG/DL (ref 8.5–10.1)
CHLORIDE SERPL-SCNC: 107 MMOL/L (ref 100–111)
CO2 SERPL-SCNC: 31 MMOL/L (ref 21–32)
CREAT SERPL-MCNC: 0.93 MG/DL (ref 0.6–1.3)
DIFFERENTIAL METHOD BLD: ABNORMAL
EOSINOPHIL # BLD: 0.2 K/UL (ref 0–0.4)
EOSINOPHIL NFR BLD: 4 % (ref 0–5)
ERYTHROCYTE [DISTWIDTH] IN BLOOD BY AUTOMATED COUNT: 12.8 % (ref 11.6–14.5)
GLOBULIN SER CALC-MCNC: 3.2 G/DL (ref 2–4)
GLUCOSE SERPL-MCNC: 93 MG/DL (ref 74–99)
HCT VFR BLD AUTO: 48.8 % (ref 36–48)
HGB BLD-MCNC: 16.3 G/DL (ref 13–16)
IMM GRANULOCYTES # BLD AUTO: 0 K/UL (ref 0–0.04)
IMM GRANULOCYTES NFR BLD AUTO: 0 % (ref 0–0.5)
LYMPHOCYTES # BLD: 1.8 K/UL (ref 0.9–3.6)
LYMPHOCYTES NFR BLD: 33 % (ref 21–52)
MCH RBC QN AUTO: 30.9 PG (ref 24–34)
MCHC RBC AUTO-ENTMCNC: 33.4 G/DL (ref 31–37)
MCV RBC AUTO: 92.4 FL (ref 78–100)
MONOCYTES # BLD: 0.4 K/UL (ref 0.05–1.2)
MONOCYTES NFR BLD: 8 % (ref 3–10)
NEUTS SEG # BLD: 3 K/UL (ref 1.8–8)
NEUTS SEG NFR BLD: 54 % (ref 40–73)
NRBC # BLD: 0 K/UL (ref 0–0.01)
NRBC BLD-RTO: 0 PER 100 WBC
PLATELET # BLD AUTO: 263 K/UL (ref 135–420)
PMV BLD AUTO: 9.9 FL (ref 9.2–11.8)
POTASSIUM SERPL-SCNC: 4.8 MMOL/L (ref 3.5–5.5)
PROT SERPL-MCNC: 7.3 G/DL (ref 6.4–8.2)
RBC # BLD AUTO: 5.28 M/UL (ref 4.35–5.65)
SODIUM SERPL-SCNC: 140 MMOL/L (ref 136–145)
WBC # BLD AUTO: 5.5 K/UL (ref 4.6–13.2)

## 2022-02-23 PROCEDURE — 85025 COMPLETE CBC W/AUTO DIFF WBC: CPT

## 2022-02-23 PROCEDURE — 80076 HEPATIC FUNCTION PANEL: CPT

## 2022-02-23 PROCEDURE — 99214 OFFICE O/P EST MOD 30 MIN: CPT | Performed by: NURSE PRACTITIONER

## 2022-02-23 PROCEDURE — 80048 BASIC METABOLIC PNL TOTAL CA: CPT

## 2022-02-23 PROCEDURE — 91200 LIVER ELASTOGRAPHY: CPT | Performed by: NURSE PRACTITIONER

## 2022-02-23 PROCEDURE — 36415 COLL VENOUS BLD VENIPUNCTURE: CPT

## 2022-02-23 NOTE — PROGRESS NOTES
[address] [facility], MD, [address], [facility], [place]      CODY Galvan, ACNP-BC     April S Inés, [facility]-BC   KEYSHAWN FarooqP-LELE Cullen, [facility]       Ca Torres [facility] [address] [place] 136    at [address], [address] Thai Bates  22.    [phone]    FAX: [address] [place] [address] [address], [address]    [phone]    FAX: [phone]         Patient Care Team:  Maxine Floyd NP as PCP - General (Nurse Practitioner)      Problem List  Date Reviewed: 6/23/2021          Codes Class Noted    Severe obesity [facility]) ICD-10-CM: E66.01  ICD-9-CM: 278.01  12/22/2020        NAFLD (nonalcoholic fatty liver disease) ICD-10-CM: K76.0  ICD-9-CM: 571.8  11/23/2020        TBI (traumatic brain injury) ([facility] ICD-10-CM: S06. 9X9A  ICD-9-CM: 854.00  11/23/2020                Nixon Corbin returns to the [facility] today for fibroscan assessment of hepatic fibrosis and for  education and management of suspected fatty liver disease. The active problem list, all pertinent past medical history, medications, liver histology, endoscopic studies, radiologic findings and laboratory findings related to the liver disorder were reviewed with the patient. The patient is a 46 y.o.  male who is suspected to have fatty liver disease based upon ultrasound. The most recent laboratory studies indicate that the AST is normal, the ALT is elevated, alkaline phosphatase is normal, tests of hepatic synthetic and metabolic function are normal, and the platelet count is normal.      Serologic evaluation was negative for all causes of chronic liver disease. The most recent imaging of the liver was Ultrasound performed in 10/2020.     Results suggest fatty liver disease. No liver mass lesions noted. An assessment of liver fibrosis with fibroscan was performed in 12/220. Results of the scan indicate normal liver stiffness, no hepatic fibrosis, and fatty liver. The fibroscan was repeated today. Results still indicate no hepatic fibrosis abut do demonstrate fatty liver. The patient has no symptoms which can be attributed to the liver disorder. The patient completes all daily activities without any functional limitations. The patient has not experienced fatigue, fevers, chills, shortness of breath, chest pain, pain in the right side over the liver, diffuse abdominal pain, nausea, vomiting, constipation, diarrhrea, dry eyes, dry mouth, arthralgias, myalgias, yellowing of the eyes or skin, itching, dark urine, problems concentrating, swelling of the abdomen, swelling of the lower extremities, hematemesis, or hematochezia. Since the last office appointment: He weighs 295 today. He has gained about 8 pounds in 2021. ASSESSMENT AND PLAN:  Fatty liver/Benign Steatosis  The diagnosis is based upon imaging and fibroscan CAP score. Fibroscan analysis indicates no hepatic fibrosis. The most recent laboratory studies indicate that the AST is normal, the ALT is elevated, alkaline phosphatase is normal, tests of hepatic synthetic and metabolic function are normal, and the platelet count is normal.       Based upon laboratory studies, imaging, and fibroscan assessment, the patient does not have significant liver injury. Serologic evaluation was negative for all causes of chronic liver disease. Only a liver biopsy can confirm if the patient does have fatty liver and differentiate NAFL from PASCUAL. The treatment is the same; weight reduction. If a liver biopsy demonstrates PASCUAL the patient could be eligible for enrollment into clinical trials for treatment of PASCUAL.     If the liver enzymes remain persistently elevated over the next 1-2 years a liver biopsy should be performed to ensure there is no ongoing chronic liver disease. If the patient looses 20% of current body weight, which is 60 pounds, down to a weight of of 240 pounds, all steatosis will have resolved. Once all steatosis has resolved all inflammation will resolve. Then all fibrosis will gradually resolve and the liver could eventually be normal.    There is currently no FDA approved medical treatment for fatty liver, NALFD or PASCUAL. The only medical treatments for PASCUAL are though clinical trials. The patient would be a good candidate for enrollment into a clinical trial if found to have PASCUAL. The need to perform an assessment of liver fibrosis was discussed with the patient. The Fibroscan can assess liver fibrosis and determine if a patient has advanced fibrosis or cirrhosis without the need for liver biopsy. This was performed in 12/2020 and again today. There is no hepatic fibrosis. There is hepatic steatosis. The Fibroscan can be repeated annually or as often as clinically indicated to assess for fibrosis progression and/or regression. Fibroscan will be repeated in one year, at his follow up appointment. Since a liver biopsy was not performed it is possible the patient may not have fatty liver or this may not be contributing to the elevation in liver enzymes. If liver enzymes do not return to normal with weight reduction then additional evaluation including liver biopsy may be necessary to determine if the elevated liver enzymes is due to another etiology. Counseling for diet and weight loss in patients with confirmed or suspected NAFLD  The patient was counseled regarding diet and exercise to achieve weight loss.   The best diet for patients with fatty liver is one very low in carbohydrates and enriched with protein such as an Blanka's program.      The patient was told not to consume any food products and drinks containing fructose as this enhances hepatic fat synthesis. There is no medication or vitamin supplements that we advocate for PASCUAL. Using glitazones in patients without diabetes mellitus has been shown to reduce fat content in the liver but has no effect on fibrosis and is associated with weight gain. Vitamin E has also been used but the data is not very good and most experts no longer advocate this. Screening for Hepatocellular Carcinoma  [facility] [facility] screening has recently been performed with ultrasound and does not suggest [address] AFP was WNL. Treatment of other medical problems in patients with chronic liver disease  There are no contraindications for the patient to take most medications that are necessary for treatment of other medical issues. The patient does not comsume alcohol on a daily basis. Normal doses of acetaminophen, as recommended on the label of the bottle, are not hepatotoxic except in the setting of daily alcohol use, even in patients with cirrhosis and can be utilized for pain. Counseling for alcohol in patients with chronic liver disease  The patient was counseled regarding alcohol consumption and the effect of alcohol on chronic liver disease. The patient does not consume any significant amount of alcohol. The patient has been abstinent from alcohol since 06/2018 when he suffered a TBI. The patient was reminded that alcohol can cause fatty liver. Vaccinations   Vaccination for viral hepatitis A and B is recommended. The patient does not have serologic evidence of prior exposure or vaccination with immunity. Routine vaccinations against other bacterial and viral agents can be performed as indicated. Annual flu vaccination should be administered if indicated. All of the above issues were discussed with the patient. All questions were answered. The patient expressed a clear understanding of the above.     ALLERGIES  No Known Allergies    MEDICATIONS  Current Outpatient Medications   Medication Sig    traZODone (DESYREL) 50 mg tablet Take 50 mg by mouth.  phentermine 30 mg capsule TAKE 1 CAPSULE BY MOUTH ONCE DAILY BEFORE BREAKFAST     No current facility-administered medications for this visit. SYSTEM REVIEW NOT RELATED TO LIVER DISEASE OR REVIEWED ABOVE:  Constitution systems: Negative for fever, chills, weight gain, weight loss. Eyes: Negative for visual changes. ENT: Negative for sore throat, painful swallowing. Respiratory: Negative for cough, hemoptysis, SOB. Cardiology: Negative for chest pain, palpitations. GI:  Negative for constipation or diarrhea. : Negative for urinary frequency, dysuria, hematuria, nocturia. Skin: Negative for rash. Hematology: Negative for easy bruising, blood clots. Musculo-skelatal: Negative for back pain, muscle pain, weakness. Neurologic: Negative for headaches, dizziness, vertigo, memory problems not related to HE. Psychology: Negative for anxiety, depression. FAMILY HISTORY:  The father  of metastatic kidney cancer at age 62. The mother has the following chronic disease(s): history of ovarian cancer. There is no family history of liver disease. There is no family history of immune disorders. SOCIAL HISTORY:  The patient is . The patient has 4 children. The patient has never used tobacco products. The patient never been a big drinker. The patient has been abstinent from alcohol since 2018. The patient currently works full time as [employer] . PHYSICAL EXAMINATION:  VS: per nursing note  General: No acute distress. Eyes: Sclera anicteric. ENT: No oral lesions. Thyroid normal.  Nodes: No adenopathy. Skin: No spider angiomata. No jaundice. No palmar erythema. Respiratory: Lungs clear to auscultation. Cardiovascular: Regular heart rate. No murmurs. No JVD. Abdomen: Soft non-tender, liver size normal to percussion/palpation. Spleen not palpable.  No obvious ascites. Extremities: No edema. No muscle wasting. No gross arthritic changes. Neurologic: Alert and oriented. Cranial nerves grossly intact. No asterixis. LABORATORY STUDIES:  Liver [place] of [address] Units 6/23/2021 11/23/2020   WBC 4.6 - 13.2 K/uL 7.2 7.7   ANC 1.8 - 8.0 K/UL 4.0 4.7   HGB 13.0 - 16.0 g/dL 16.4 (H) 15.9    - 420 K/uL 284 265   INR 0.8 - 1.2    1.1   AST 10 - 38 U/L 32 46 (H)   ALT 16 - 61 U/L 68 (H) 90 (H)   Alk Phos 45 - 117 U/L 96 112   Bili, Total 0.2 - 1.0 MG/DL 1.2 (H) 1.4 (H)   Bili, Direct 0.0 - 0.2 MG/DL 0.2 0.3 (H)   Albumin 3.4 - 5.0 g/dL 4.1 4.2   BUN 7.0 - 18 MG/DL 14    Creat 0.6 - 1.3 MG/DL 0.97    Na 136 - 145 mmol/L 142    K 3.5 - 5.5 mmol/L 4.6    Cl 100 - 111 mmol/L 108    CO2 21 - 32 mmol/L 30    Glucose 74 - 99 mg/dL 85      SEROLOGIES:  Serologies Latest Ref Rng & Units 11/23/2020   Hep A Ab, Total Negative   Negative   Hep B Core Ab, Total Negative   Negative   Hep B Surface Ab >10.0 mIU/mL <3.10 (L)   Hep B Surface Ab Interp POS   Negative (A)   Hep C Ab 0.0 - 0.9 s/co ratio <0.1   Ferritin 8 - 388 NG/   Iron % Saturation 20 - 50 % 25   ERLIN, IFA  Negative   C-ANCA Neg:<1:20 titer <1:20   P-ANCA Neg:<1:20 titer <1:20   ANCA Neg:<1:20 titer <1:20   ASMCA 0 - 19 Units 3     LIVER HISTOLOGY:  12/2020. FibroScan performed at [address]. EkPa was 5.4. IQR/med 15%. . The results suggested a fibrosis level of F0. The CAP score suggests fatty liver. 02/2022. FibroScan performed at [address]. EkPa was 5.1. IQR/med 7%. . The results suggested a fibrosis level of F0. The CAP score suggests there is hepatic steatosis. ENDOSCOPIC PROCEDURES:  Not available or performed    RADIOLOGY:  10/2020. Abdominal ultrasound DR VARGAS [facility]): Enlarged measuring 19 cm in length and increased in echotexture suggesting fatty infiltration.     OTHER TESTING:  Not available or performed    FOLLOW-UP:  All of the issues listed above in the Assessment and Plan were discussed with the patient. All questions were answered. The patient expressed a clear understanding of the above. [address] in one year for repeat fibroscan analysis. Weight loss goal in 25  pounds.         KEYSHAWN HeadP-C  Liver [place] [address]   [phone]

## 2022-03-19 PROBLEM — K76.0 NAFLD (NONALCOHOLIC FATTY LIVER DISEASE): Status: ACTIVE | Noted: 2020-11-23

## 2022-03-19 PROBLEM — E66.01 SEVERE OBESITY (HCC): Status: ACTIVE | Noted: 2020-12-22

## 2022-03-19 PROBLEM — S06.9XAA TBI (TRAUMATIC BRAIN INJURY): Status: ACTIVE | Noted: 2020-11-23

## 2023-02-22 ENCOUNTER — HOSPITAL ENCOUNTER (OUTPATIENT)
Facility: HOSPITAL | Age: 43
Setting detail: SPECIMEN
Discharge: HOME | End: 2023-02-25
Payer: COMMERCIAL

## 2023-02-22 ENCOUNTER — OFFICE VISIT (OUTPATIENT)
Age: 43
End: 2023-02-22
Payer: COMMERCIAL

## 2023-02-22 VITALS
HEART RATE: 52 BPM | HEIGHT: 72 IN | WEIGHT: 205 LBS | OXYGEN SATURATION: 98 % | TEMPERATURE: 96.9 F | BODY MASS INDEX: 27.77 KG/M2

## 2023-02-22 DIAGNOSIS — K76.0 FATTY (CHANGE OF) LIVER, NOT ELSEWHERE CLASSIFIED: Primary | ICD-10-CM

## 2023-02-22 DIAGNOSIS — K76.0 FATTY (CHANGE OF) LIVER, NOT ELSEWHERE CLASSIFIED: ICD-10-CM

## 2023-02-22 DIAGNOSIS — Z98.84 H/O GASTRIC BYPASS: ICD-10-CM

## 2023-02-22 PROBLEM — E66.01 SEVERE OBESITY (HCC): Status: RESOLVED | Noted: 2020-12-22 | Resolved: 2023-02-22

## 2023-02-22 LAB
ALBUMIN SERPL-MCNC: 3.9 G/DL (ref 3.4–5)
ALBUMIN/GLOB SERPL: 1.4 (ref 0.8–1.7)
ALP SERPL-CCNC: 111 U/L (ref 45–117)
ALT SERPL-CCNC: 18 U/L (ref 16–61)
ANION GAP SERPL CALC-SCNC: 3 MMOL/L (ref 3–18)
AST SERPL-CCNC: 14 U/L (ref 10–38)
BASOPHILS # BLD: 0 K/UL (ref 0–0.1)
BASOPHILS NFR BLD: 1 % (ref 0–2)
BILIRUB DIRECT SERPL-MCNC: 0.3 MG/DL (ref 0–0.2)
BILIRUB SERPL-MCNC: 1.4 MG/DL (ref 0.2–1)
BUN SERPL-MCNC: 14 MG/DL (ref 7–18)
BUN/CREAT SERPL: 17 (ref 12–20)
CALCIUM SERPL-MCNC: 9 MG/DL (ref 8.5–10.1)
CHLORIDE SERPL-SCNC: 108 MMOL/L (ref 100–111)
CO2 SERPL-SCNC: 31 MMOL/L (ref 21–32)
CREAT SERPL-MCNC: 0.81 MG/DL (ref 0.6–1.3)
DIFFERENTIAL METHOD BLD: NORMAL
EOSINOPHIL # BLD: 0.1 K/UL (ref 0–0.4)
EOSINOPHIL NFR BLD: 3 % (ref 0–5)
ERYTHROCYTE [DISTWIDTH] IN BLOOD BY AUTOMATED COUNT: 13.4 % (ref 11.6–14.5)
GLOBULIN SER CALC-MCNC: 2.8 G/DL (ref 2–4)
GLUCOSE SERPL-MCNC: 89 MG/DL (ref 74–99)
HCT VFR BLD AUTO: 42.7 % (ref 36–48)
HGB BLD-MCNC: 14.1 G/DL (ref 13–16)
IMM GRANULOCYTES # BLD AUTO: 0 K/UL (ref 0–0.04)
IMM GRANULOCYTES NFR BLD AUTO: 0 % (ref 0–0.5)
LYMPHOCYTES # BLD: 1.7 K/UL (ref 0.9–3.6)
LYMPHOCYTES NFR BLD: 34 % (ref 21–52)
MCH RBC QN AUTO: 30.8 PG (ref 24–34)
MCHC RBC AUTO-ENTMCNC: 33 G/DL (ref 31–37)
MCV RBC AUTO: 93.2 FL (ref 78–100)
MONOCYTES # BLD: 0.4 K/UL (ref 0.05–1.2)
MONOCYTES NFR BLD: 8 % (ref 3–10)
NEUTS SEG # BLD: 2.8 K/UL (ref 1.8–8)
NEUTS SEG NFR BLD: 55 % (ref 40–73)
NRBC # BLD: 0 K/UL (ref 0–0.01)
NRBC BLD-RTO: 0 PER 100 WBC
PLATELET # BLD AUTO: 230 K/UL (ref 135–420)
PMV BLD AUTO: 10.5 FL (ref 9.2–11.8)
POTASSIUM SERPL-SCNC: 3.9 MMOL/L (ref 3.5–5.5)
PROT SERPL-MCNC: 6.7 G/DL (ref 6.4–8.2)
RBC # BLD AUTO: 4.58 M/UL (ref 4.35–5.65)
SODIUM SERPL-SCNC: 142 MMOL/L (ref 136–145)
WBC # BLD AUTO: 5.1 K/UL (ref 4.6–13.2)

## 2023-02-22 PROCEDURE — 80048 BASIC METABOLIC PNL TOTAL CA: CPT

## 2023-02-22 PROCEDURE — 80076 HEPATIC FUNCTION PANEL: CPT

## 2023-02-22 PROCEDURE — 85025 COMPLETE CBC W/AUTO DIFF WBC: CPT

## 2023-02-22 PROCEDURE — 36415 COLL VENOUS BLD VENIPUNCTURE: CPT

## 2023-02-22 PROCEDURE — 99214 OFFICE O/P EST MOD 30 MIN: CPT | Performed by: NURSE PRACTITIONER

## 2023-02-22 PROCEDURE — 91200 LIVER ELASTOGRAPHY: CPT | Performed by: NURSE PRACTITIONER

## 2023-02-22 RX ORDER — OMEPRAZOLE 40 MG/1
40 CAPSULE, DELAYED RELEASE ORAL
COMMUNITY
Start: 2022-08-20 | End: 2023-02-22

## 2023-02-22 RX ORDER — URSODIOL 500 MG/1
500 TABLET, FILM COATED ORAL
COMMUNITY
Start: 2022-12-01

## 2023-02-22 ASSESSMENT — PATIENT HEALTH QUESTIONNAIRE - PHQ9
1. LITTLE INTEREST OR PLEASURE IN DOING THINGS: 0
SUM OF ALL RESPONSES TO PHQ9 QUESTIONS 1 & 2: 0
SUM OF ALL RESPONSES TO PHQ QUESTIONS 1-9: 0
SUM OF ALL RESPONSES TO PHQ QUESTIONS 1-9: 0
2. FEELING DOWN, DEPRESSED OR HOPELESS: 0
SUM OF ALL RESPONSES TO PHQ QUESTIONS 1-9: 0
SUM OF ALL RESPONSES TO PHQ QUESTIONS 1-9: 0

## 2023-02-22 NOTE — PROGRESS NOTES
[facility]      William Palacios MD, FACP, FACG, FAASLD      Jigna Fuentes, PA-TYRA Taylor, [facility]     Delores Hayes, [facility]   [facility]    Risa Leyva, [facility]    at [facility]    [address], [address]    [place]  [address] [phone]    FAX: [phone]   [facility]    [address], [address]    [place]  [address] [phone]    FAX: [phone]         Patient Care Team:  Chelsea Ho, NP as PCP - General (Nurse Practitioner)      Problem List  Date Reviewed: 6/23/2021            Codes Class Noted    Severe obesity (HCC) ICD-10-CM: E66.01  ICD-9-CM: 278.01  12/22/2020        NAFLD (nonalcoholic fatty liver disease) ICD-10-CM: K76.0  ICD-9-CM: 571.8  11/23/2020        TBI (traumatic brain injury) (HCC) ICD-10-CM: S06.9X9A  ICD-9-CM: 854.00  11/23/2020                  Reji Rodriguez returns to [facility] today for fibroscan assessment of hepatic fibrosis and for  education and management of suspected fatty liver disease.  The active problem list, all pertinent past medical history, medications, liver histology, endoscopic studies, radiologic findings and laboratory findings related to the liver disorder were reviewed with the patient.     The patient is a 42 y.o.  male who is suspected to have fatty liver disease based upon ultrasound.      The most recent laboratory studies indicate that the AST is normal, the ALT is normal, alkaline phosphatase is normal, tests of hepatic synthetic and metabolic function are normal, and the platelet count is normal.      Serologic evaluation was negative for all causes of chronic liver disease.     The most recent imaging of the liver was Ultrasound performed in 10/2021.    Results  suggest fatty liver disease.  No liver mass lesions noted.    An assessment of liver fibrosis with fibroscan was performed in 12/220. Results of the scan indicate normal liver stiffness, no hepatic fibrosis, and fatty liver.    The fibroscan was repeated today.  Results still indicate no hepatic fibrosis abut do demonstrate fatty liver.      The patient has no symptoms which can be attributed to the liver disorder.    The patient completes all daily activities without any functional limitations. The patient has not experienced fatigue, fevers, chills, shortness of breath, chest pain, pain in the right side over the liver, diffuse abdominal pain, nausea, vomiting, constipation, diarrhrea, dry eyes, dry mouth, arthralgias, myalgias, yellowing of the eyes or skin, itching, dark urine, problems concentrating, swelling of the abdomen, swelling of the lower extremities, hematemesis, or hematochezia.    Since the last office appointment:  Had gastric bypass surgery in 08/2022 by Dr. Keller.    He has lost 90 pounds since the bypass.  Had hernia repair surgery in 08/2022.  Had esophageal repair surgery in 08/2022.      ASSESSMENT AND PLAN:  Fatty liver/Benign Steatosis  The diagnosis is based upon imaging and fibroscan CAP score.      Fibroscan analysis indicates no hepatic fibrosis.      The most recent laboratory studies indicate that the AST is normal, the ALT is normal, alkaline phosphatase is normal, tests of hepatic synthetic and metabolic function are normal, and the platelet count is normal.       Based upon laboratory studies, imaging, and fibroscan assessment, the patient does not have significant liver injury.    Serologic evaluation was negative for all causes of chronic liver disease.     Only a liver biopsy can confirm if the patient does have fatty liver and differentiate NAFL from CRUZ.  The treatment is the same; weight reduction.  If a liver biopsy demonstrates CRUZ the patient could be eligible for  enrollment into clinical trials for treatment of CRUZ. The patient underwent gastric bypass in 008/2022 and all the complications of metabolic syndrome have now resolved, including the fatty liver disease. The need to perform an assessment of liver fibrosis was discussed with the patient. The Fibroscan can assess liver fibrosis and determine if a patient has advanced fibrosis or cirrhosis without the need for liver biopsy. This was performed in 12/2020 and again today. There is no hepatic fibrosis. There is hepatic steatosis. Counseling for diet and weight loss in patients with confirmed or suspected NAFLD  The patient was counseled regarding diet and exercise to achieve weight loss. The best diet for patients with fatty liver is one very low in carbohydrates and enriched with protein such as an [employer] program.      The patient was told not to consume any food products and drinks containing fructose as this enhances hepatic fat synthesis. There is no medication or vitamin supplements that we advocate for CRUZ. Using glitazones in patients without diabetes mellitus has been shown to reduce fat content in the liver but has no effect on fibrosis and is associated with weight gain. Vitamin E has also been used but the data is not very good and most experts no longer advocate this. Screening for Hepatocellular Carcinoma  [address] screening has recently been performed with ultrasound and does not suggest [address] AFP was WNL. Treatment of other medical problems in patients with chronic liver disease  There are no contraindications for the patient to take most medications that are necessary for treatment of other medical issues. The patient does not comsume alcohol on a daily basis.     Normal doses of acetaminophen, as recommended on the label of the bottle, are not hepatotoxic except in the setting of daily alcohol use, even in patients with cirrhosis and can be utilized for pain.    Counseling for alcohol in patients with chronic liver disease  The patient was counseled regarding alcohol consumption and the effect of alcohol on chronic liver disease. The patient does not consume any significant amount of alcohol. The patient has been abstinent from alcohol since 06/2018 when he suffered a TBI. The patient was reminded that alcohol can cause fatty liver. Patients who have undergone obesity surgery are at much greater risk to develop alcoholic liver injury. Vaccinations   Vaccination for viral hepatitis A and B is recommended. The patient does not have serologic evidence of prior exposure or vaccination with immunity. Routine vaccinations against other bacterial and viral agents can be performed as indicated. Annual flu vaccination should be administered if indicated. All of the above issues were discussed with the patient. All questions were answered. The patient expressed a clear understanding of the above. ALLERGIES  No Known Allergies    MEDICATIONS  Current Outpatient Medications   Medication Sig    traZODone (DESYREL) 50 mg tablet Take 50 mg by mouth.    phentermine 30 mg capsule TAKE 1 CAPSULE BY MOUTH ONCE DAILY BEFORE BREAKFAST     No current facility-administered medications for this visit. SYSTEM REVIEW NOT RELATED TO LIVER DISEASE OR REVIEWED ABOVE:  Constitution systems: Negative for fever, chills, weight gain, weight loss. Eyes: Negative for visual changes. ENT: Negative for sore throat, painful swallowing. Respiratory: Negative for cough, hemoptysis, SOB. Cardiology: Negative for chest pain, palpitations. GI:  Negative for constipation or diarrhea. : Negative for urinary frequency, dysuria, hematuria, nocturia. Skin: Negative for rash. Hematology: Negative for easy bruising, blood clots. Musculo-skelatal: Negative for back pain, muscle pain, weakness.   Neurologic: Negative for headaches, dizziness, vertigo, memory problems not related to HE. Psychology: Negative for anxiety, depression. FAMILY HISTORY:  The father  of metastatic kidney cancer at age 62. The mother has the following chronic disease(s): history of ovarian cancer. There is no family history of liver disease. There is no family history of immune disorders. SOCIAL HISTORY:  The patient is . The patient has 4 children. The patient has never used tobacco products. The patient never been a big drinker. The patient has been abstinent from alcohol since 2018. The patient currently works full time as [employer] . PHYSICAL EXAMINATION:  VS: per nursing note  General: No acute distress. Eyes: Sclera anicteric. ENT: No oral lesions. Thyroid normal.  Nodes: No adenopathy. Skin: No spider angiomata. No jaundice. No palmar erythema. Respiratory: Lungs clear to auscultation. Cardiovascular: Regular heart rate. No murmurs. No JVD. Abdomen: Soft non-tender, liver size normal to percussion/palpation. Spleen not palpable. No obvious ascites. Extremities: No edema. No muscle wasting. No gross arthritic changes. Neurologic: Alert and oriented. Cranial nerves grossly intact. No asterixis.     LABORATORY STUDIES:  Liver [place] of [address] Units 2023   WBC 4.6 - 13.2 K/uL 5.1 5.5   ANC 1.8 - 8.0 K/UL  3.0   ANC 1.8 - 8.0 K/UL 2.8    HGB 13.0 - 16.0 g/dL 14.1 16.3 (H)    - 420 K/uL 230 263   INR 0.8 - 1.2       AST 10 - 38 U/L 14 32   ALT 16 - 61 U/L 18 72 (H)   Alk Phos 45 - 117 U/L 111 96   Bili, Total 0.2 - 1.0 MG/DL 1.4 (H) 1.2 (H)   Bili, Direct 0.0 - 0.2 MG/DL 0.3 (H) 0.2   Albumin 3.4 - 5.0 g/dL 3.9 4.1   BUN 7.0 - 18 MG/DL 14 14   Creat 0.6 - 1.3 MG/DL 0.81 0.93   Na 136 - 145 mmol/L 142 140   K 3.5 - 5.5 mmol/L 3.9 4.8   Cl 100 - 111 mmol/L 108 107   CO2 21 - 32 mmol/L 31 31   Glucose 74 - 99 mg/dL 89 93     SEROLOGIES:  Serologies Latest Ref Rng & Units 2020   Hep A Ab, Total Negative   Negative   Hep B Core Ab, Total Negative   Negative   Hep B Surface Ab >10.0 mIU/mL <3.10 (L)   Hep B Surface Ab Interp POS   Negative (A)   Hep C Ab 0.0 - 0.9 s/co ratio <0.1   Ferritin 8 - 388 NG/   Iron % Saturation 20 - 50 % 25   JAYCEE, IFA  Negative   C-ANCA Neg:<1:20 titer <1:20   P-ANCA Neg:<1:20 titer <1:20   ANCA Neg:<1:20 titer <1:20   ASMCA 0 - 19 Units 3     LIVER HISTOLOGY:  12/2020. FibroScan performed at [facility]. EkPa was 5.4. IQR/med 15%. . The results suggested a fibrosis level of F0. The CAP score suggests fatty liver. 02/2022. FibroScan performed at [facility]. EkPa was 5.1. IQR/med 7%. . The results suggested a fibrosis level of F0. The CAP score suggests there is hepatic steatosis. 02/2023. FibroScan performed at [facility]. EkPa was 4.9. IQR/med 7%. . The results suggested a fibrosis level of F0. The CAP score suggests there is no significant hepatic steatosis. ENDOSCOPIC PROCEDURES:  Not available or performed    RADIOLOGY:  10/2020. Abdominal ultrasound DR MEMBRENO [facility]): Enlarged measuring 19 cm in length and increased in echotexture suggesting fatty infiltration. 10/2021. Abdominal ultrasound ([facility]). The liver demonstrates increased echogenicity and heterogeneous echotexture. No focal liver lesion is seen. OTHER TESTING:  Not available or performed    FOLLOW-UP:  All of the issues listed above in the Assessment and Plan were discussed with the patient. All questions were answered. The patient expressed a clear understanding of the above. Follow-up [facility] on a PRN basis.       MONTSERRAT Alvarez-TYRA  Liver [place] of [address], [address] [place] [address] [address]   [phone]